# Patient Record
Sex: FEMALE | Race: WHITE | Employment: OTHER | URBAN - METROPOLITAN AREA
[De-identification: names, ages, dates, MRNs, and addresses within clinical notes are randomized per-mention and may not be internally consistent; named-entity substitution may affect disease eponyms.]

---

## 2017-12-29 ENCOUNTER — HOSPITAL ENCOUNTER (OUTPATIENT)
Dept: MAMMOGRAPHY | Age: 72
Discharge: HOME | End: 2017-12-29
Attending: OBSTETRICS & GYNECOLOGY | Admitting: OBSTETRICS & GYNECOLOGY

## 2017-12-29 DIAGNOSIS — Z12.31 VISIT FOR SCREENING MAMMOGRAM: ICD-10-CM

## 2018-01-09 ENCOUNTER — PROCEDURE VISIT (OUTPATIENT)
Age: 73
End: 2018-01-09

## 2018-01-09 ENCOUNTER — OFFICE VISIT (OUTPATIENT)
Age: 73
End: 2018-01-09

## 2018-01-09 ENCOUNTER — HOSPITAL ENCOUNTER (OUTPATIENT)
Dept: GENERAL RADIOLOGY | Age: 73
Discharge: HOME | End: 2018-01-09
Attending: INTERNAL MEDICINE | Admitting: INTERNAL MEDICINE

## 2018-01-09 VITALS
SYSTOLIC BLOOD PRESSURE: 110 MMHG | BODY MASS INDEX: 33.27 KG/M2 | DIASTOLIC BLOOD PRESSURE: 68 MMHG | HEIGHT: 67 IN | WEIGHT: 212 LBS

## 2018-01-09 DIAGNOSIS — M89.9 DISORDER OF BONE AND CARTILAGE: ICD-10-CM

## 2018-01-09 DIAGNOSIS — Z51.81 MEDICATION MONITORING ENCOUNTER: ICD-10-CM

## 2018-01-09 DIAGNOSIS — M94.9 DISORDER OF BONE AND CARTILAGE: Primary | ICD-10-CM

## 2018-01-09 DIAGNOSIS — M89.9 DISORDER OF BONE AND CARTILAGE: Primary | ICD-10-CM

## 2018-01-09 DIAGNOSIS — M94.9 DISORDER OF BONE AND CARTILAGE: ICD-10-CM

## 2018-01-09 DIAGNOSIS — E55.9 VITAMIN D DEFICIENCY: ICD-10-CM

## 2018-01-09 PROCEDURE — G8399 PT W/DXA RESULTS DOCUMENT: HCPCS | Performed by: INTERNAL MEDICINE

## 2018-01-09 PROCEDURE — 99214 OFFICE O/P EST MOD 30 MIN: CPT | Performed by: INTERNAL MEDICINE

## 2018-01-09 PROCEDURE — 77080 DXA BONE DENSITY AXIAL: CPT | Performed by: INTERNAL MEDICINE

## 2018-01-09 PROCEDURE — 3014F SCREEN MAMMO DOC REV: CPT | Performed by: INTERNAL MEDICINE

## 2018-01-09 PROCEDURE — G8417 CALC BMI ABV UP PARAM F/U: HCPCS | Performed by: INTERNAL MEDICINE

## 2018-01-09 PROCEDURE — 1123F ACP DISCUSS/DSCN MKR DOCD: CPT | Performed by: INTERNAL MEDICINE

## 2018-01-09 PROCEDURE — 4040F PNEUMOC VAC/ADMIN/RCVD: CPT | Performed by: INTERNAL MEDICINE

## 2018-01-09 PROCEDURE — G8427 DOCREV CUR MEDS BY ELIG CLIN: HCPCS | Performed by: INTERNAL MEDICINE

## 2018-01-09 PROCEDURE — 1090F PRES/ABSN URINE INCON ASSESS: CPT | Performed by: INTERNAL MEDICINE

## 2018-01-09 PROCEDURE — 1036F TOBACCO NON-USER: CPT | Performed by: INTERNAL MEDICINE

## 2018-01-09 PROCEDURE — G8484 FLU IMMUNIZE NO ADMIN: HCPCS | Performed by: INTERNAL MEDICINE

## 2018-01-09 PROCEDURE — 3017F COLORECTAL CA SCREEN DOC REV: CPT | Performed by: INTERNAL MEDICINE

## 2018-01-09 RX ORDER — ALENDRONATE SODIUM 70 MG/1
TABLET ORAL
Qty: 12 TABLET | Refills: 4 | Status: SHIPPED | OUTPATIENT
Start: 2018-01-09 | End: 2019-01-28 | Stop reason: SDUPTHER

## 2018-01-09 NOTE — PROGRESS NOTES
from chair without using arms. No apparent focal motor or sensory deficit. Reflexes brisk and symmetric. Coordination appears normal.  MUSCULOSKELETAL EXAM:  Gait: Intact without difficulty. Steady without assistance. Spine: Spinal contours are normal.  No spine tenderness to palpation or percussion. Ribs and pelvis: Ribs appear normal. Pelvis appears normal. Two finger spaces between ribs and pelvis. BONE DENSITY:  Most recent done here using Hologic equipment. T-scores  Initial study: 10/16/1996 spine L2+L4 -0.1 Lowest hip (left total hip) +0.3   Current study: 12/27/2016 spine L2+L4 -1.9 Lowest hip (left fem. neck) -0.5     The table below shows bone mineral density (grams/cm2), the appropriate measure for comparing serial scans An increase or decrease is significant based on precision studies done at our center according to the ISCD protocol. .      PA spine Proximal Femur (left)   Date L2+L4 Fem. neck Trochanter Total hip   10/16/1996 1.058 0.915 0.758 0.979   12/08/2003 0.886 0.811 0.718 0.948   07/25/2005 0.964 0.865 0.749 0.986   07/31/2006 0.897 0.897 0.740 0.992   08/24/2007 0.910 0.902 0.744 0.991   10/02/2009 0.940 0.827 0.776 1.004   10/24/2011 0.923 0.782 0.705 0.913   10/29/2013 0.865 0.835 0.674 0.934   11/28/2014 0.869 0.833 0.682 0.952   12/22/2015 0.834 0.792 0.664 0.915   12/27/2016 0.896 0.792 0.683 0.905   01/09/2018 0.863 0.798 0.685 0.901      IMPRESSION:  BONE DENSITY IS LOW. BETWEEN 2016 AND 2018, BMD DID NOT CHANGE SIGNIFICANTLY IN THE LEFT HIP. IT IS LOWER NOW IN THE SPINE BUT SIMILAR TO 2013/2014/2015. LABS. 03/2015, Cr 0.8. 12/2015 . 05/2017 Ca 9.6 Cr 0.8. IMAGING. DXA printouts reviewed. ASSESSMENT: Vitamin D deficiency now corrected. Bone density borderline low and stable or improved with Actonel [id number]. She is doing well with alendronate restarted 01/2016. PLANS: Continue alendronate 70 mg week.  Return appointment in 1 year with DXA      I

## 2019-01-15 ENCOUNTER — OFFICE VISIT (OUTPATIENT)
Dept: ENDOCRINOLOGY | Age: 74
End: 2019-01-15
Payer: MEDICARE

## 2019-01-15 ENCOUNTER — HOSPITAL ENCOUNTER (OUTPATIENT)
Dept: GENERAL RADIOLOGY | Age: 74
Discharge: HOME | End: 2019-01-15
Payer: MEDICARE

## 2019-01-15 ENCOUNTER — PROCEDURE VISIT (OUTPATIENT)
Dept: ENDOCRINOLOGY | Age: 74
End: 2019-01-15
Payer: MEDICARE

## 2019-01-15 VITALS
SYSTOLIC BLOOD PRESSURE: 120 MMHG | DIASTOLIC BLOOD PRESSURE: 79 MMHG | HEIGHT: 67 IN | BODY MASS INDEX: 36.35 KG/M2 | WEIGHT: 231.6 LBS

## 2019-01-15 DIAGNOSIS — M89.9 DISORDER OF BONE AND CARTILAGE: Primary | ICD-10-CM

## 2019-01-15 DIAGNOSIS — M94.9 DISORDER OF BONE AND CARTILAGE: Primary | ICD-10-CM

## 2019-01-15 DIAGNOSIS — Z51.81 MEDICATION MONITORING ENCOUNTER: ICD-10-CM

## 2019-01-15 DIAGNOSIS — E55.9 VITAMIN D DEFICIENCY: ICD-10-CM

## 2019-01-15 DIAGNOSIS — M94.9 DISORDER OF BONE AND CARTILAGE: ICD-10-CM

## 2019-01-15 DIAGNOSIS — M89.9 DISORDER OF BONE AND CARTILAGE: ICD-10-CM

## 2019-01-15 PROCEDURE — 1090F PRES/ABSN URINE INCON ASSESS: CPT | Performed by: INTERNAL MEDICINE

## 2019-01-15 PROCEDURE — 77080 DXA BONE DENSITY AXIAL: CPT

## 2019-01-15 PROCEDURE — 77080 DXA BONE DENSITY AXIAL: CPT | Performed by: INTERNAL MEDICINE

## 2019-01-15 PROCEDURE — 1123F ACP DISCUSS/DSCN MKR DOCD: CPT | Performed by: INTERNAL MEDICINE

## 2019-01-15 PROCEDURE — G8399 PT W/DXA RESULTS DOCUMENT: HCPCS | Performed by: INTERNAL MEDICINE

## 2019-01-15 PROCEDURE — 99214 OFFICE O/P EST MOD 30 MIN: CPT | Performed by: INTERNAL MEDICINE

## 2019-01-15 PROCEDURE — 1036F TOBACCO NON-USER: CPT | Performed by: INTERNAL MEDICINE

## 2019-01-15 PROCEDURE — 3017F COLORECTAL CA SCREEN DOC REV: CPT | Performed by: INTERNAL MEDICINE

## 2019-01-15 PROCEDURE — G8484 FLU IMMUNIZE NO ADMIN: HCPCS | Performed by: INTERNAL MEDICINE

## 2019-01-15 PROCEDURE — G8417 CALC BMI ABV UP PARAM F/U: HCPCS | Performed by: INTERNAL MEDICINE

## 2019-01-15 PROCEDURE — 1101F PT FALLS ASSESS-DOCD LE1/YR: CPT | Performed by: INTERNAL MEDICINE

## 2019-01-15 PROCEDURE — G8427 DOCREV CUR MEDS BY ELIG CLIN: HCPCS | Performed by: INTERNAL MEDICINE

## 2019-01-15 PROCEDURE — 4040F PNEUMOC VAC/ADMIN/RCVD: CPT | Performed by: INTERNAL MEDICINE

## 2019-01-15 RX ORDER — M-VIT,TX,IRON,MINS/CALC/FOLIC 27MG-0.4MG
1 TABLET ORAL DAILY
Qty: 12 TABLET | Refills: 4 | Status: SHIPPED | OUTPATIENT
Start: 2019-01-15

## 2019-01-28 ENCOUNTER — TELEPHONE (OUTPATIENT)
Dept: ENDOCRINOLOGY | Age: 74
End: 2019-01-28

## 2019-01-28 RX ORDER — ALENDRONATE SODIUM 70 MG/1
TABLET ORAL
Qty: 12 TABLET | Refills: 4 | Status: SHIPPED | OUTPATIENT
Start: 2019-01-28 | End: 2019-11-05 | Stop reason: SDUPTHER

## 2019-11-15 ENCOUNTER — HOSPITAL ENCOUNTER (OUTPATIENT)
Dept: MAMMOGRAPHY | Age: 74
Discharge: HOME | End: 2019-11-15
Payer: MEDICARE

## 2019-11-15 DIAGNOSIS — Z12.31 VISIT FOR SCREENING MAMMOGRAM: ICD-10-CM

## 2019-11-15 PROCEDURE — 77063 BREAST TOMOSYNTHESIS BI: CPT

## 2020-01-21 ENCOUNTER — HOSPITAL ENCOUNTER (OUTPATIENT)
Dept: GENERAL RADIOLOGY | Age: 75
Discharge: HOME | End: 2020-01-21
Payer: MEDICARE

## 2020-01-21 ENCOUNTER — PROCEDURE VISIT (OUTPATIENT)
Dept: ENDOCRINOLOGY | Age: 75
End: 2020-01-21
Payer: MEDICARE

## 2020-01-21 ENCOUNTER — OFFICE VISIT (OUTPATIENT)
Dept: ENDOCRINOLOGY | Age: 75
End: 2020-01-21
Payer: MEDICARE

## 2020-01-21 VITALS
SYSTOLIC BLOOD PRESSURE: 113 MMHG | BODY MASS INDEX: 33.62 KG/M2 | DIASTOLIC BLOOD PRESSURE: 69 MMHG | WEIGHT: 214.2 LBS | HEIGHT: 67 IN

## 2020-01-21 PROCEDURE — G8417 CALC BMI ABV UP PARAM F/U: HCPCS | Performed by: INTERNAL MEDICINE

## 2020-01-21 PROCEDURE — G8427 DOCREV CUR MEDS BY ELIG CLIN: HCPCS | Performed by: INTERNAL MEDICINE

## 2020-01-21 PROCEDURE — 77080 DXA BONE DENSITY AXIAL: CPT | Performed by: INTERNAL MEDICINE

## 2020-01-21 PROCEDURE — 1036F TOBACCO NON-USER: CPT | Performed by: INTERNAL MEDICINE

## 2020-01-21 PROCEDURE — 77080 DXA BONE DENSITY AXIAL: CPT

## 2020-01-21 PROCEDURE — G8484 FLU IMMUNIZE NO ADMIN: HCPCS | Performed by: INTERNAL MEDICINE

## 2020-01-21 PROCEDURE — G8399 PT W/DXA RESULTS DOCUMENT: HCPCS | Performed by: INTERNAL MEDICINE

## 2020-01-21 PROCEDURE — 4040F PNEUMOC VAC/ADMIN/RCVD: CPT | Performed by: INTERNAL MEDICINE

## 2020-01-21 PROCEDURE — 1123F ACP DISCUSS/DSCN MKR DOCD: CPT | Performed by: INTERNAL MEDICINE

## 2020-01-21 PROCEDURE — 3017F COLORECTAL CA SCREEN DOC REV: CPT | Performed by: INTERNAL MEDICINE

## 2020-01-21 PROCEDURE — 99214 OFFICE O/P EST MOD 30 MIN: CPT | Performed by: INTERNAL MEDICINE

## 2020-01-21 PROCEDURE — 1090F PRES/ABSN URINE INCON ASSESS: CPT | Performed by: INTERNAL MEDICINE

## 2020-01-21 RX ORDER — EPINEPHRINE 0.3 MG/.3ML
INJECTION SUBCUTANEOUS
COMMUNITY

## 2020-01-21 RX ORDER — ALENDRONATE SODIUM 70 MG/1
TABLET ORAL
Qty: 12 TABLET | Refills: 4 | Status: SHIPPED | OUTPATIENT
Start: 2020-01-21 | End: 2022-02-15 | Stop reason: SDUPTHER

## 2020-01-21 RX ORDER — TRIAMCINOLONE ACETONIDE 0.1 %
PASTE (GRAM) DENTAL
COMMUNITY

## 2020-01-21 NOTE — PROGRESS NOTES
sensory deficit. Reflexes brisk and symmetric. Coordination appears normal.  MUSCULOSKELETAL EXAM:  Gait: Intact without difficulty. Steady without assistance. Spine: Spinal contours are normal.  No spine tenderness to palpation or percussion. Ribs and pelvis: Ribs appear normal. Pelvis appears normal. Two finger spaces between ribs and pelvis. BONE DENSITY:  Most recent done here using Hologic equipment. T-scores  Initial study: 10/16/1996 spine L2+L4 -0.1 Lowest hip (left total hip) +0.3   Current study: 01/21/2020 spine L2+L4 -1.7 Lowest hip (left fem. neck) -0.5     The table below shows bone mineral density (grams/cm2), the appropriate measure for comparing serial scans. An increase or decrease is significant based on precision studies done at our center according to the ISCD protocol. PA spine Proximal Femur (left)   Date L2+L4 Fem. neck Trochanter Total hip   10/16/1996 1.058 0.915 0.758 0.979   12/08/2003 0.886 0.811 0.718 0.948   07/25/2005 0.964 0.865 0.749 0.986   07/31/2006 0.897 0.897 0.740 0.992   08/24/2007 0.910 0.902 0.744 0.991   10/02/2009 0.940 0.827 0.776 1.004   10/24/2011 0.923 0.782 0.705 0.913   10/29/2013 0.865 0.835 0.674 0.934   11/28/2014 0.869 0.833 0.682 0.952   12/22/2015 0.834 0.792 0.664 0.915   12/27/2016 0.896 0.792 0.683 0.905   01/09/2018 0.863 0.798 0.685 0.901   01/15/2019 0.891 0.791 0.676 0.905   01/21/2020 0.935 0.786 0.685 0.913      IMPRESSION:  BONE DENSITY IS LOW. SINCE THE PREVIOUS DXA, BMD INCREASED IN THE SPINE AND DID NOT CHANGE SIGNIFICANTLY IN THE LEFT HIP. COMPARED WITH 2015, BMD IS HIGHER NOW IN THE SPINE.     LABS. 03/2015, Cr 0.8. 12/2015 . 05/2017 Ca 9.6 Cr 0.8. 01/2019 Ca 8.8 Cr 0.6. IMAGING. DXA printouts reviewed. ASSESSMENT: Vitamin D deficiency now corrected. Bone density borderline low and stable or improved with Actonel [id number]. She is doing well with alendronate restarted 01/2016.     PLANS: Continue alendronate 70 mg weekly. Likely time for a [place] next year. . Return appointment in 1 year with DXA      I spent 25 minutes face to face with this patient. Over 50% of that time was spent on counseling and care coordination. See assessment and plan for counseling and care coordination details. Talia Queen MD, Director, [facility]) Osteoporosis and Bone Health Services    CC: Jer Polanco MD

## 2020-11-16 ENCOUNTER — HOSPITAL ENCOUNTER (OUTPATIENT)
Dept: MAMMOGRAPHY | Age: 75
Discharge: HOME | End: 2020-11-16
Payer: MEDICARE

## 2020-11-16 PROCEDURE — 77063 BREAST TOMOSYNTHESIS BI: CPT

## 2021-01-26 ENCOUNTER — PROCEDURE VISIT (OUTPATIENT)
Dept: ENDOCRINOLOGY | Age: 76
End: 2021-01-26

## 2021-01-26 ENCOUNTER — OFFICE VISIT (OUTPATIENT)
Dept: ENDOCRINOLOGY | Age: 76
End: 2021-01-26
Payer: MEDICARE

## 2021-01-26 ENCOUNTER — HOSPITAL ENCOUNTER (OUTPATIENT)
Dept: GENERAL RADIOLOGY | Age: 76
Discharge: HOME | End: 2021-01-26
Payer: MEDICARE

## 2021-01-26 VITALS
BODY MASS INDEX: 36.35 KG/M2 | WEIGHT: 231.6 LBS | SYSTOLIC BLOOD PRESSURE: 132 MMHG | HEIGHT: 67 IN | DIASTOLIC BLOOD PRESSURE: 78 MMHG

## 2021-01-26 DIAGNOSIS — M94.9 DISORDER OF BONE AND CARTILAGE: ICD-10-CM

## 2021-01-26 DIAGNOSIS — M89.9 DISORDER OF BONE AND CARTILAGE: ICD-10-CM

## 2021-01-26 DIAGNOSIS — M89.9 DISORDER OF BONE AND CARTILAGE: Primary | ICD-10-CM

## 2021-01-26 DIAGNOSIS — E55.9 VITAMIN D DEFICIENCY: ICD-10-CM

## 2021-01-26 DIAGNOSIS — Z51.81 MEDICATION MONITORING ENCOUNTER: ICD-10-CM

## 2021-01-26 DIAGNOSIS — M94.9 DISORDER OF BONE AND CARTILAGE: Primary | ICD-10-CM

## 2021-01-26 PROCEDURE — G8427 DOCREV CUR MEDS BY ELIG CLIN: HCPCS | Performed by: INTERNAL MEDICINE

## 2021-01-26 PROCEDURE — 1090F PRES/ABSN URINE INCON ASSESS: CPT | Performed by: INTERNAL MEDICINE

## 2021-01-26 PROCEDURE — 1123F ACP DISCUSS/DSCN MKR DOCD: CPT | Performed by: INTERNAL MEDICINE

## 2021-01-26 PROCEDURE — 77080 DXA BONE DENSITY AXIAL: CPT | Performed by: INTERNAL MEDICINE

## 2021-01-26 PROCEDURE — G8484 FLU IMMUNIZE NO ADMIN: HCPCS | Performed by: INTERNAL MEDICINE

## 2021-01-26 PROCEDURE — 4040F PNEUMOC VAC/ADMIN/RCVD: CPT | Performed by: INTERNAL MEDICINE

## 2021-01-26 PROCEDURE — 1036F TOBACCO NON-USER: CPT | Performed by: INTERNAL MEDICINE

## 2021-01-26 PROCEDURE — G8417 CALC BMI ABV UP PARAM F/U: HCPCS | Performed by: INTERNAL MEDICINE

## 2021-01-26 PROCEDURE — 77080 DXA BONE DENSITY AXIAL: CPT

## 2021-01-26 PROCEDURE — G8399 PT W/DXA RESULTS DOCUMENT: HCPCS | Performed by: INTERNAL MEDICINE

## 2021-01-26 PROCEDURE — 99215 OFFICE O/P EST HI 40 MIN: CPT | Performed by: INTERNAL MEDICINE

## 2021-01-26 NOTE — RESULT ENCOUNTER NOTE
[facility]) Osteoporosis and [address] [facility] [address] [place] [address]   Phone [phone]  Fax [phone]    NAME: Jimmy Maxwell   :    STUDY DATE: 2021     REFERRING PHYSICIAN: Katlyn Thompson MD    INDICATION(S) FOR PERFORMING THE STUDY:  osteopenia (M89.9)    CLINICAL INFORMATION PROVIDED BY THE PATIENT: 70-year-old woman. She underwent surgical menopause at age 47. She took estrogen from ~ until ~. No history of fragility fractures. No long-term corticosteroid use. She took Actonel 2004-10/2009 (stopped for a drug holiday, restarted 2016). Family history of osteoporosis (mother). EQUIPMENT: [employer]. POSITIONING: Good   REGIONS OF INTEREST: Correct   ARTIFACTS: None   STUDY VALID? Yes; L1 and L3 were deleted. T-scores  Initial study: 10/16/1996 spine L2+L4 -0.1 Lowest hip (left total hip) +0.3   Current study: 2021 spine L2+L4 -1.5 Lowest hip (left fem. neck) -0.6     The table below shows bone mineral density (grams/cm2), the appropriate measure for comparing serial scans. An increase or decrease is significant based on precision studies done at our center according to the ISCD protocol. PA spine Proximal Femur (left)   Date L2+L4 Fem.  neck Trochanter Total hip   10/16/1996 1.058 0.915 0.758 0.979   2003 0.886 0.811 0.718 0.948   2005 0.964 0.865 0.749 0.986   2006 0.897 0.897 0.740 0.992   2007 0.910 0.902 0.744 0.991   10/02/2009 0.940 0.827 0.776 1.004   10/24/2011 0.923 0.782 0.705 0.913   10/29/2013 0.865 0.835 0.674 0.934   2014 0.869 0.833 0.682 0.952   2015 0.834 0.792 0.664 0.915   2016 0.896 0.792 0.683 0.905   2018 0.863 0.798 0.685 0.901   01/15/2019 0.891 0.791 0.676 0.905   2020 0.935 0.786 0.685 0.913   2021 0.907 0.782 0.705 0.917 IMPRESSION:  BONE DENSITY IS LOW. SINCE THE LAST DXA, BMD DID NOT CHANGE SIGNIFICANTLY IN THE SPINE OR LEFT HIP.  COMPARED WITH 2015, BMD IS HIGHER NOW IN THE SPINE AND TROCHANTER. Consider repeating this study in 2-3 years to assess the patient's progress. _________________________________________________   Gearld Sid Queen MD, Director, [facility]) Osteoporosis and [address] [place] [address]

## 2021-01-26 NOTE — PROGRESS NOTES
[facility] Osteoporosis and [address] [facility] [address] [facility]  Phone [phone]  Fax [phone]    NAME: Bernabe Vallejo OF BIRTH: 1945  INITIAL CONSULTATION: 04/26/2004  LAST OFFICE VISIT: 01/21/2020  TODAY'S VISIT: 01/26/2021    Labs @ [facility] 01/2019    PROBLEMS:   \"Osteopenia\" (12/2003 T-scores spine -1.6, left femoral neck -0.3)    Bone density declined significantly between 1996 and 2003    Mother has severe osteoporosis  Surgical menopause age 47 (1997)  Irritable bowel syndrome  Vitamin D deficiency, desirable 25-OH D is 30 to 60 ng/mL    18 ng/mL 04/2004     46 ng/mL 07/2005 with ergocalciferol 50,000 IU weekly    36 ng/mL 07/2012 with vitamin D 2000 IU/d    64 ng/mL 05/2017    CURRENT MANAGEMENT FOR BONE HEALTH:   Calcium, 450 mg/d from diet + 600 mg supplement + 220 in MVI = 1270 mg/d    300 mg other, 150 mg milk (avoiding yogurt and cheese to lose weight)  Vitamin D, 2000 IU/d + 500 in MVI = 2500 IU/d  Exercise, walking, swimming, riding stationary bike  Pharmacologic therapy, alendronate 70 mg weekly resumed 01/2016 (missed 11/2016)    PREVIOUS MEDICATIONS FOR OSTEOPOROSIS: Estrogen [id number]  Actonel 35 mg weekly started 07/2004, stopped 10/2009 for a drug holiday    OTHER CURRENT MEDICATIONS (SELECTED): None  OTC MEDICATIONS: glucosamine/chondroitin, vitamin C, Metamucil    CHIEF COMPLAINT: Here for f/u visit of osteopenia and vitamin D deficiency, monitoring treatment. No new related signs or symptoms. PAST MEDICAL HISTORY, FAMILY HISTORY, SOCIAL HISTORY AND REVIEW OF SYSTEMS:  Relevant changes since last visit (see patient questionnaire of todays date). INTERVAL HISTORY. See problem list for active/ongoing issues. She resumed taking alendronate 12/2016, correctly and without side effects. No falls, near-falls or fractures. NEUROLOGIC EXAM: Able to rise from chair without using arms. No apparent focal motor or sensory deficit. Reflexes brisk and symmetric. Coordination appears normal.  MUSCULOSKELETAL EXAM:  Gait: Intact without difficulty. Steady without assistance. Spine: Spinal contours are normal.  No spine tenderness to palpation or percussion. Ribs and pelvis: Ribs appear normal. Pelvis appears normal. Two finger spaces between ribs and pelvis. BONE DENSITY:  Most recent done here using Hologic equipment. T-scores  Initial study: 10/16/1996 spine L2+L4 -0.1 Lowest hip (left total hip) +0.3   Current study: 01/26/2021 spine L2+L4 -1.5 Lowest hip (left fem. neck) -0.6     The table below shows bone mineral density (grams/cm2), the appropriate measure for comparing serial scans. An increase or decrease is significant based on precision studies done at our center according to the ISCD protocol. PA spine Proximal Femur (left)   Date L2+L4 Fem. neck Trochanter Total hip   10/16/1996 1.058 0.915 0.758 0.979   12/08/2003 0.886 0.811 0.718 0.948   07/25/2005 0.964 0.865 0.749 0.986   07/31/2006 0.897 0.897 0.740 0.992   08/24/2007 0.910 0.902 0.744 0.991   10/02/2009 0.940 0.827 0.776 1.004   10/24/2011 0.923 0.782 0.705 0.913   10/29/2013 0.865 0.835 0.674 0.934   11/28/2014 0.869 0.833 0.682 0.952   12/22/2015 0.834 0.792 0.664 0.915   12/27/2016 0.896 0.792 0.683 0.905   01/09/2018 0.863 0.798 0.685 0.901   01/15/2019 0.891 0.791 0.676 0.905   01/21/2020 0.935 0.786 0.685 0.913   01/26/2021 0.907 0.782 0.705 0.917      IMPRESSION:  BONE DENSITY IS LOW. SINCE THE LAST DXA, BMD DID NOT CHANGE SIGNIFICANTLY IN THE SPINE OR LEFT HIP.  COMPARED WITH 2015, BMD IS HIGHER NOW IN THE SPINE AND TROCHANTER.     LABS. 03/2015, Cr 0.8. 12/2015 . 05/2017 Ca 9.6 Cr 0.8. 01/2019 Ca 8.8 Cr 0.6. IMAGING. DXA printouts reviewed. ASSESSMENT: Vitamin D deficiency now corrected. Bone density borderline low and stable or improved with Actonel [id number]. She is doing well with alendronate restarted 01/2016. PLANS:  Stop alendronate for a holiday of 1-2 years. Lab: RFP. We discussed testing for her , age 79, and her daughter, age 52. Time spent today: at least 40 minutes. Lit Queen MD, Director, [facility]) Osteoporosis and Bone Health Services    CC: Chi Youngblood MD

## 2021-02-08 LAB
ALBUMIN SERPL-MCNC: 4.4 G/DL (ref 3.5–5.7)
ANION GAP SERPL CALCULATED.3IONS-SCNC: 8 MMOL/L (ref 6–18)
BUN BLDV-MCNC: 14 MG/DL (ref 8–26)
CALCIUM SERPL-MCNC: 9.6 MG/DL (ref 8.5–10.4)
CHLORIDE BLD-SCNC: 105 MEQ/L (ref 98–111)
CO2: 26 MMOL/L (ref 21–31)
CREAT SERPL-MCNC: 0.82 MG/DL (ref 0.6–1.2)
GFR AFRICAN AMERICAN: 78 ML/MIN/1.73 M2
GFR NON-AFRICAN AMERICAN: 68 ML/MIN/1.73 M2
GLUCOSE BLD-MCNC: 92 MG/DL (ref 70–99)
PHOSPHORUS: 3.5 MG/DL (ref 2.5–4.5)
POTASSIUM SERPL-SCNC: 4.1 MEQ/L (ref 3.6–5.1)
SODIUM BLD-SCNC: 139 MEQ/L (ref 135–145)

## 2022-01-14 ENCOUNTER — HOSPITAL ENCOUNTER (OUTPATIENT)
Dept: MAMMOGRAPHY | Age: 77
Discharge: HOME | End: 2022-01-14
Payer: MEDICARE

## 2022-01-14 VITALS — HEIGHT: 68 IN | WEIGHT: 204 LBS | BODY MASS INDEX: 30.92 KG/M2

## 2022-01-14 DIAGNOSIS — Z12.31 VISIT FOR SCREENING MAMMOGRAM: ICD-10-CM

## 2022-01-14 PROCEDURE — 77063 BREAST TOMOSYNTHESIS BI: CPT

## 2022-02-15 ENCOUNTER — HOSPITAL ENCOUNTER (OUTPATIENT)
Dept: GENERAL RADIOLOGY | Age: 77
Discharge: HOME | End: 2022-02-15
Payer: MEDICARE

## 2022-02-15 ENCOUNTER — OFFICE VISIT (OUTPATIENT)
Dept: ENDOCRINOLOGY | Age: 77
End: 2022-02-15
Payer: MEDICARE

## 2022-02-15 ENCOUNTER — PROCEDURE VISIT (OUTPATIENT)
Dept: ENDOCRINOLOGY | Age: 77
End: 2022-02-15

## 2022-02-15 VITALS
HEIGHT: 67 IN | DIASTOLIC BLOOD PRESSURE: 74 MMHG | BODY MASS INDEX: 33.68 KG/M2 | SYSTOLIC BLOOD PRESSURE: 121 MMHG | WEIGHT: 214.6 LBS

## 2022-02-15 DIAGNOSIS — M94.9 DISORDER OF BONE AND CARTILAGE: ICD-10-CM

## 2022-02-15 DIAGNOSIS — M89.9 DISORDER OF BONE AND CARTILAGE: ICD-10-CM

## 2022-02-15 DIAGNOSIS — E55.9 VITAMIN D DEFICIENCY: ICD-10-CM

## 2022-02-15 DIAGNOSIS — M94.9 DISORDER OF BONE AND CARTILAGE: Primary | ICD-10-CM

## 2022-02-15 DIAGNOSIS — M89.9 DISORDER OF BONE AND CARTILAGE: Primary | ICD-10-CM

## 2022-02-15 DIAGNOSIS — Z51.81 MEDICATION MONITORING ENCOUNTER: ICD-10-CM

## 2022-02-15 PROCEDURE — 4040F PNEUMOC VAC/ADMIN/RCVD: CPT | Performed by: INTERNAL MEDICINE

## 2022-02-15 PROCEDURE — 1036F TOBACCO NON-USER: CPT | Performed by: INTERNAL MEDICINE

## 2022-02-15 PROCEDURE — 1123F ACP DISCUSS/DSCN MKR DOCD: CPT | Performed by: INTERNAL MEDICINE

## 2022-02-15 PROCEDURE — G8427 DOCREV CUR MEDS BY ELIG CLIN: HCPCS | Performed by: INTERNAL MEDICINE

## 2022-02-15 PROCEDURE — 77080 DXA BONE DENSITY AXIAL: CPT | Performed by: INTERNAL MEDICINE

## 2022-02-15 PROCEDURE — G8484 FLU IMMUNIZE NO ADMIN: HCPCS | Performed by: INTERNAL MEDICINE

## 2022-02-15 PROCEDURE — G8399 PT W/DXA RESULTS DOCUMENT: HCPCS | Performed by: INTERNAL MEDICINE

## 2022-02-15 PROCEDURE — 1090F PRES/ABSN URINE INCON ASSESS: CPT | Performed by: INTERNAL MEDICINE

## 2022-02-15 PROCEDURE — 77080 DXA BONE DENSITY AXIAL: CPT

## 2022-02-15 PROCEDURE — G8417 CALC BMI ABV UP PARAM F/U: HCPCS | Performed by: INTERNAL MEDICINE

## 2022-02-15 PROCEDURE — 99214 OFFICE O/P EST MOD 30 MIN: CPT | Performed by: INTERNAL MEDICINE

## 2022-02-15 RX ORDER — ALENDRONATE SODIUM 70 MG/1
TABLET ORAL
Qty: 12 TABLET | Refills: 4 | Status: SHIPPED | OUTPATIENT
Start: 2022-02-15 | End: 2022-02-15 | Stop reason: SDUPTHER

## 2022-02-15 RX ORDER — ALENDRONATE SODIUM 70 MG/1
TABLET ORAL
Qty: 12 TABLET | Refills: 4 | Status: SHIPPED | OUTPATIENT
Start: 2022-02-15

## 2022-02-15 NOTE — PROGRESS NOTES
symmetric. Coordination appears normal.  MUSCULOSKELETAL EXAM:  Gait: Intact without difficulty. Steady without assistance. Spine: Spinal contours are normal.  No spine tenderness to palpation or percussion. Ribs and pelvis: Ribs appear normal. Pelvis appears normal. Two finger spaces between ribs and pelvis. BONE DENSITY:  Most recent done here using Hologic equipment. T-scores  Initial study: 10/16/1996 spine L2+L4 -0.1 Lowest hip (left total hip) +0.3   Current study: 02/15/2022 spine L2+L4 -2.0 Lowest hip (left fem. neck) -0.7     The table below shows bone mineral density (grams/cm2), the appropriate measure for comparing serial scans. An increase or decrease is significant based on precision studies done at our center according to the ISCD protocol. PA spine Proximal Femur (left)   Date L2+L4 Fem. neck Trochanter Total hip   10/16/1996 1.058 0.915 0.758 0.979   12/08/2003 0.886 0.811 0.718 0.948   10/02/2009 0.940 0.827 0.776 1.004   10/24/2011 0.923 0.782 0.705 0.913   11/28/2014 0.869 0.833 0.682 0.952   12/22/2015 0.834 0.792 0.664 0.915   12/27/2016 0.896 0.792 0.683 0.905   01/21/2020 0.935 0.786 0.685 0.913   01/26/2021 0.907 0.782 0.705 0.917   02/15/2022 0.852 0.767 0.667 0.883      IMPRESSION:  BONE DENSITY IS LOW. SINCE THE LAST DXA, BMD DECREASED IN THE SPINE, TROCHANTER AND TOTAL HIP AND IS TRENDING DOWN IN THE FEMORAL NECK. LABS. 03/2015 Cr 0.8. 12/2015 . 05/2017 Ca 9.6 Cr 0.8. 01/2019 Ca 8.8 Cr 0.6.   02/2021 Ca 9.6 Cr 0.8. IMAGING. DXA printouts reviewed. ASSESSMENT: Vitamin D deficiency now corrected. Bone density borderline low and stable or improved with Actonel [id number]. She did well with alendronate 01/2016-01/2021 but BMD decreased 0933-1324 on holiday.      PLANS:  Start back on alendronate 70 mg weekly. Rx sent, dosing instructions reviewed. Return appointment in 1 year. Time spent today: 30-39 minutes. Omar Queen MD, Director, [facility] Osteoporosis and Bone Health Services    CC: Marla Yap MD

## 2022-02-15 NOTE — RESULT ENCOUNTER NOTE
[facility]) Osteoporosis and [address] [facility] [address] [facility], [address] [place] [address],[place] [address]  Phone [phone]  Fax [phone]    NAME: Angeles Powell   :    STUDY DATE: 02/15/2022     REFERRING PHYSICIAN: Jacob Cagle MD    INDICATION(S) FOR PERFORMING THE STUDY:  osteopenia (M89.9)    CLINICAL INFORMATION PROVIDED BY THE PATIENT: 79-year-old woman. She underwent surgical menopause at age 47. She took estrogen from ~ until ~. No history of fragility fractures. No long-term corticosteroid use. She took Actonel 2004-10/2009 (stopped for a holiday, restarted 2016). Family history of osteoporosis (mother). EQUIPMENT: Hologic Discovery. POSITIONING: Good   REGIONS OF INTEREST: Correct   ARTIFACTS: None   STUDY VALID? Yes; L1 and L3 were deleted. T-scores  Initial study: 10/16/1996 spine L2+L4 -0.1 Lowest hip (left total hip) +0.3   Current study: 02/15/2022 spine L2+L4 -2.0 Lowest hip (left fem. neck) -0.7     The table below shows bone mineral density (grams/cm2), the appropriate measure for comparing serial scans. An increase or decrease is significant based on precision studies done at our center according to the ISCD protocol. PA spine Proximal Femur (left)   Date L2+L4 Fem. neck Trochanter Total hip   10/16/1996 1.058 0.915 0.758 0.979   2003 0.886 0.811 0.718 0.948   10/02/2009 0.940 0.827 0.776 1.004   10/24/2011 0.923 0.782 0.705 0.913   2014 0.869 0.833 0.682 0.952   2015 0.834 0.792 0.664 0.915   2016 0.896 0.792 0.683 0.905   2020 0.935 0.786 0.685 0.913   2021 0.907 0.782 0.705 0.917   02/15/2022 0.852 0.767 0.667 0.883      IMPRESSION:  BONE DENSITY IS LOW. SINCE THE LAST DXA, BMD DECREASED IN THE SPINE, TROCHANTER AND TOTAL HIP AND IS TRENDING DOWN IN THE FEMORAL NECK. Consider repeating this study in 1-2 years to assess the patient's progress.   _________________________________________________ Edilson Queen MD, Director, [facility]) Osteoporosis and [address]

## 2023-01-28 PROBLEM — M85.89 OSTEOPENIA OF MULTIPLE SITES: Status: ACTIVE | Noted: 2023-01-28

## 2023-02-27 ENCOUNTER — OFFICE VISIT (OUTPATIENT)
Dept: ENDOCRINOLOGY | Age: 78
End: 2023-02-27
Payer: MEDICARE

## 2023-02-27 ENCOUNTER — HOSPITAL ENCOUNTER (OUTPATIENT)
Dept: GENERAL RADIOLOGY | Age: 78
Discharge: HOME | End: 2023-02-27
Payer: MEDICARE

## 2023-02-27 ENCOUNTER — PROCEDURE VISIT (OUTPATIENT)
Dept: ENDOCRINOLOGY | Age: 78
End: 2023-02-27

## 2023-02-27 VITALS
HEIGHT: 67 IN | SYSTOLIC BLOOD PRESSURE: 151 MMHG | DIASTOLIC BLOOD PRESSURE: 80 MMHG | BODY MASS INDEX: 34.09 KG/M2 | WEIGHT: 217.2 LBS

## 2023-02-27 DIAGNOSIS — M89.9 DISORDER OF BONE AND CARTILAGE: ICD-10-CM

## 2023-02-27 DIAGNOSIS — Z51.81 MEDICATION MONITORING ENCOUNTER: ICD-10-CM

## 2023-02-27 DIAGNOSIS — M94.9 DISORDER OF BONE AND CARTILAGE: Primary | ICD-10-CM

## 2023-02-27 DIAGNOSIS — M94.9 DISORDER OF BONE AND CARTILAGE: ICD-10-CM

## 2023-02-27 DIAGNOSIS — M89.9 DISORDER OF BONE AND CARTILAGE: Primary | ICD-10-CM

## 2023-02-27 DIAGNOSIS — M85.89 OSTEOPENIA OF MULTIPLE SITES: ICD-10-CM

## 2023-02-27 DIAGNOSIS — E55.9 VITAMIN D DEFICIENCY: ICD-10-CM

## 2023-02-27 PROCEDURE — 1090F PRES/ABSN URINE INCON ASSESS: CPT | Performed by: INTERNAL MEDICINE

## 2023-02-27 PROCEDURE — G8484 FLU IMMUNIZE NO ADMIN: HCPCS | Performed by: INTERNAL MEDICINE

## 2023-02-27 PROCEDURE — G8399 PT W/DXA RESULTS DOCUMENT: HCPCS | Performed by: INTERNAL MEDICINE

## 2023-02-27 PROCEDURE — G8427 DOCREV CUR MEDS BY ELIG CLIN: HCPCS | Performed by: INTERNAL MEDICINE

## 2023-02-27 PROCEDURE — 1036F TOBACCO NON-USER: CPT | Performed by: INTERNAL MEDICINE

## 2023-02-27 PROCEDURE — 1123F ACP DISCUSS/DSCN MKR DOCD: CPT | Performed by: INTERNAL MEDICINE

## 2023-02-27 PROCEDURE — 77080 DXA BONE DENSITY AXIAL: CPT

## 2023-02-27 PROCEDURE — 77080 DXA BONE DENSITY AXIAL: CPT | Performed by: INTERNAL MEDICINE

## 2023-02-27 PROCEDURE — G8417 CALC BMI ABV UP PARAM F/U: HCPCS | Performed by: INTERNAL MEDICINE

## 2023-02-27 PROCEDURE — 99214 OFFICE O/P EST MOD 30 MIN: CPT | Performed by: INTERNAL MEDICINE

## 2023-02-27 RX ORDER — LIDOCAINE 50 MG/G
1 PATCH TOPICAL EVERY 24 HOURS
COMMUNITY
Start: 2022-11-16

## 2023-02-27 RX ORDER — GABAPENTIN 300 MG/1
300 CAPSULE ORAL 3 TIMES DAILY
COMMUNITY

## 2023-02-27 RX ORDER — ALENDRONATE SODIUM 70 MG/1
TABLET ORAL
Qty: 12 TABLET | Refills: 4 | Status: SHIPPED | OUTPATIENT
Start: 2023-02-27

## 2023-02-27 NOTE — PROGRESS NOTES
[facility]) Osteoporosis and [address] [facility]  Phone [phone]  Fax [phone]    NAME: Kellie Escamilla OF BIRTH: 1945  INITIAL CONSULTATION: 04/26/2004  LAST OFFICE VISIT: 02/15/2022  TODAY'S VISIT: 02/27/2023    Labs @  12/2022    PROBLEMS:   \"Osteopenia\" (12/2003 T-scores spine -1.6, left femoral neck -0.3)    Bone density declined significantly between 1996 and 2003    Mother has severe osteoporosis  Surgical menopause age 47 (1997)  Irritable bowel syndrome  Vitamin D deficiency, desirable 25-OH D is 30 to 60 ng/mL    18 ng/mL 04/2004     46 ng/mL 07/2005 with ergocalciferol 50,000 IU weekly    36 ng/mL 07/2012 with vitamin D 2000 IU/d    64 ng/mL 05/2017    CURRENT MANAGEMENT FOR BONE HEALTH:   Calcium, 450 mg/d from diet + 600 mg supplement + 220 in MVI = 1270 mg/d    300 mg other, 150 mg milk (avoiding yogurt and cheese to lose weight)  Vitamin D, 2000 IU/d + 500 in MVI = 2500 IU/d  Exercise, walking, swimming, riding stationary bike  Pharmacologic therapy, alendronate 70 mg weekly again 01/2016-01/2021, resumed 02/2022    PREVIOUS MEDICATIONS FOR OSTEOPOROSIS: Estrogen [id number]  Actonel 35 mg weekly 07/2004-10/2009, stopped for a holiday    OTHER CURRENT MEDICATIONS (SELECTED): None  OTC MEDICATIONS: glucosamine/chondroitin, vitamin C, Metamucil    CHIEF COMPLAINT: Here for f/u visit of osteopenia and vitamin D deficiency, monitoring treatment. No new related signs or symptoms. PAST MEDICAL HISTORY, FAMILY HISTORY, SOCIAL HISTORY AND REVIEW OF SYSTEMS:  Relevant changes since last visit (see patient questionnaire of today's date). INTERVAL HISTORY. See problem list for active/ongoing issues. She has been taking alendronate correctly and without side effects. No falls, near-falls or fractures.    08/2022 abdominal surgery for a softball-size benign lesion between the stomach and spleen; spleen and 20% of stomach removed. 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 Lost a front tooth last week from my 's pizza!   Has a temporary replacement and likely will have a bridge. NEUROLOGIC EXAM: Able to rise from chair without using arms. No apparent focal motor or sensory deficit. Reflexes brisk and symmetric. Coordination appears normal.  MUSCULOSKELETAL EXAM:  Gait: Intact without difficulty. Steady without assistance. Spine: Spinal contours are normal.  No spine tenderness to palpation or percussion. Ribs and pelvis: Ribs appear normal. Pelvis appears normal. Two finger spaces between ribs and pelvis. BONE DENSITY:  Most recent done here using [employer] equipment. T-scores  Initial study: 10/16/1996 spine L2+L4 -0.1 Lowest hip (left total hip) +0.3   Current study: 02/27/2023 02/27/2023 -0.7 Lowest hip (left fem. neck) -0.7     The table below shows bone mineral density (grams/cm2), the appropriate measure for comparing serial scans. An increase or decrease is significant based on precision studies done at our center according to the ISCD protocol. PA spine Proximal Femur (left)   Date L2+L4 Fem. neck Trochanter Total hip   10/16/1996 1.058 0.915 0.758 0.979   12/08/2003 0.886 0.811 0.718 0.948   10/02/2009 0.940 0.827 0.776 1.004   10/24/2011 0.923 0.782 0.705 0.913   11/28/2014 0.869 0.833 0.682 0.952   12/22/2015 0.834 0.792 0.664 0.915   01/21/2020 0.935 0.786 0.685 0.913   01/26/2021 0.907 0.782 0.705 0.917   02/15/2022 0.852 0.767 0.667 0.883   02/27/2023 0.995 0.769 0.609 0.846      IMPRESSION:  BONE DENSITY NOW NORMAL. SINCE THE LAST DXA, BMD INCREASED IN THE SPINE BUT IS LOWER IN THE TROCHANTER AND TOTAL HIP. LABS. 03/2015 Cr 0.8. 12/2015 . 05/2017 Ca 9.6 Cr 0.8. 01/2019 Ca 8.8 Cr 0.6.   02/2021 Ca 9.6 Cr 0.8. 12/2022 Ca 9.2 Cr 0.7. IMAGING. DXA printouts reviewed. ASSESSMENT: Vitamin D deficiency now corrected.   Bone density borderline low and stable or improved with Actonel [id number]. She did well with alendronate 01/2016-01/2021 but BMD decreased 0647-5562 on holiday.  She is doing OK with alendronate re-started 02/2022. PLANS:  Continue alendronate 70 mg weekly. Return appointment in 1 year. Time spent today: 30-39 minutes. Trish Queen MD, Director, [facility]) Osteoporosis and Bone Health Services    CC: Albert Gu MD

## 2023-02-27 NOTE — PROGRESS NOTES
Patient advised by nurse to monitor blood pressure and follow up with PCP  Patient is under stress related to prior surgery

## 2023-04-21 ENCOUNTER — HOSPITAL ENCOUNTER (OUTPATIENT)
Dept: MAMMOGRAPHY | Age: 78
Discharge: HOME | End: 2023-04-21
Payer: MEDICARE

## 2023-04-21 VITALS — WEIGHT: 217 LBS | HEIGHT: 66 IN | BODY MASS INDEX: 34.87 KG/M2

## 2023-04-21 DIAGNOSIS — Z12.31 VISIT FOR SCREENING MAMMOGRAM: ICD-10-CM

## 2023-04-21 PROCEDURE — 77063 BREAST TOMOSYNTHESIS BI: CPT

## 2023-04-28 LAB — VITAMIN D 25-HYDROXY: 56.4 NG/ML (ref 30–150)

## 2024-02-04 PROBLEM — M85.80 OSTEOPENIA WITH HIGH RISK OF FRACTURE: Status: ACTIVE | Noted: 2023-01-28

## 2024-03-18 ENCOUNTER — HOSPITAL ENCOUNTER (OUTPATIENT)
Dept: GENERAL RADIOLOGY | Age: 79
Discharge: HOME | End: 2024-03-18
Payer: MEDICARE

## 2024-03-18 ENCOUNTER — OFFICE VISIT (OUTPATIENT)
Dept: ENDOCRINOLOGY | Age: 79
End: 2024-03-18
Payer: MEDICARE

## 2024-03-18 VITALS
WEIGHT: 220 LBS | HEART RATE: 67 BPM | HEIGHT: 67 IN | DIASTOLIC BLOOD PRESSURE: 88 MMHG | SYSTOLIC BLOOD PRESSURE: 140 MMHG | RESPIRATION RATE: 16 BRPM | BODY MASS INDEX: 34.53 KG/M2

## 2024-03-18 DIAGNOSIS — M85.80 OSTEOPENIA WITH HIGH RISK OF FRACTURE: ICD-10-CM

## 2024-03-18 DIAGNOSIS — Z51.81 MEDICATION MONITORING ENCOUNTER: ICD-10-CM

## 2024-03-18 DIAGNOSIS — M94.9 DISORDER OF BONE AND CARTILAGE: ICD-10-CM

## 2024-03-18 DIAGNOSIS — M94.9 DISORDER OF BONE AND CARTILAGE: Primary | ICD-10-CM

## 2024-03-18 DIAGNOSIS — M89.9 DISORDER OF BONE AND CARTILAGE: ICD-10-CM

## 2024-03-18 DIAGNOSIS — M89.9 DISORDER OF BONE AND CARTILAGE: Primary | ICD-10-CM

## 2024-03-18 DIAGNOSIS — E55.9 VITAMIN D DEFICIENCY: ICD-10-CM

## 2024-03-18 PROCEDURE — 77080 DXA BONE DENSITY AXIAL: CPT | Performed by: INTERNAL MEDICINE

## 2024-03-18 PROCEDURE — 99214 OFFICE O/P EST MOD 30 MIN: CPT | Performed by: INTERNAL MEDICINE

## 2024-03-18 PROCEDURE — G8399 PT W/DXA RESULTS DOCUMENT: HCPCS | Performed by: INTERNAL MEDICINE

## 2024-03-18 PROCEDURE — G8417 CALC BMI ABV UP PARAM F/U: HCPCS | Performed by: INTERNAL MEDICINE

## 2024-03-18 PROCEDURE — 1036F TOBACCO NON-USER: CPT | Performed by: INTERNAL MEDICINE

## 2024-03-18 PROCEDURE — G2211 COMPLEX E/M VISIT ADD ON: HCPCS | Performed by: INTERNAL MEDICINE

## 2024-03-18 PROCEDURE — G8427 DOCREV CUR MEDS BY ELIG CLIN: HCPCS | Performed by: INTERNAL MEDICINE

## 2024-03-18 PROCEDURE — 1123F ACP DISCUSS/DSCN MKR DOCD: CPT | Performed by: INTERNAL MEDICINE

## 2024-03-18 PROCEDURE — G8484 FLU IMMUNIZE NO ADMIN: HCPCS | Performed by: INTERNAL MEDICINE

## 2024-03-18 PROCEDURE — 77080 DXA BONE DENSITY AXIAL: CPT

## 2024-03-18 PROCEDURE — 1090F PRES/ABSN URINE INCON ASSESS: CPT | Performed by: INTERNAL MEDICINE

## 2024-03-18 RX ORDER — HYDROCORTISONE ACETATE 0.5 %
CREAM (GRAM) TOPICAL
COMMUNITY
Start: 2024-03-11

## 2024-03-18 RX ORDER — ALENDRONATE SODIUM 70 MG/1
TABLET ORAL
Qty: 12 TABLET | Refills: 4 | Status: SHIPPED | OUTPATIENT
Start: 2024-03-18

## 2024-03-29 LAB
ALBUMIN SERPL-MCNC: 4 G/DL (ref 3.5–5.7)
ANION GAP SERPL CALCULATED.3IONS-SCNC: 7 MMOL/L (ref 4–16)
BUN BLDV-MCNC: 13 MG/DL (ref 8–26)
CALCIUM SERPL-MCNC: 10 MG/DL (ref 8.5–10.4)
CHLORIDE BLD-SCNC: 105 MEQ/L (ref 98–111)
CO2: 28 MMOL/L (ref 21–31)
CREAT SERPL-MCNC: 0.81 MG/DL (ref 0.6–1.2)
EGFR (CKD-EPI): 73 ML/MIN/1.73 M2
GLUCOSE BLD-MCNC: 79 MG/DL (ref 70–99)
PHOSPHORUS: 3.3 MG/DL (ref 2.5–4.5)
POTASSIUM SERPL-SCNC: 4.2 MEQ/L (ref 3.6–5.1)
SODIUM BLD-SCNC: 140 MEQ/L (ref 135–145)

## 2024-05-21 ENCOUNTER — HOSPITAL ENCOUNTER (OUTPATIENT)
Dept: MAMMOGRAPHY | Age: 79
Discharge: HOME | End: 2024-05-21
Payer: MEDICARE

## 2024-05-21 VITALS — BODY MASS INDEX: 35.36 KG/M2 | HEIGHT: 66 IN | WEIGHT: 220 LBS

## 2024-05-21 DIAGNOSIS — Z12.31 VISIT FOR SCREENING MAMMOGRAM: ICD-10-CM

## 2024-05-21 PROCEDURE — 77063 BREAST TOMOSYNTHESIS BI: CPT

## 2024-08-07 NOTE — RESULT ENCOUNTER NOTE
[facility]) Osteoporosis and [address] [facility] [address] [facility], [address] [place] [address],[place] [address]  Phone [phone]  Fax [phone]    NAME: Vasiliy Baugh   :    STUDY DATE: 2023     REFERRING PHYSICIAN: Jazmine Gunn MD    INDICATION(S) FOR PERFORMING THE STUDY:  osteopenia (M89.9)    CLINICAL INFORMATION PROVIDED BY THE PATIENT: 74-year-old woman. She underwent surgical menopause at age 47. She took estrogen from ~ until ~. No history of fragility fractures. No long-term corticosteroid use. She took Actonel 2004-10/2009 (stopped for a [place]), again 2016-2021, restarted 2022. Family history of osteoporosis (mother). EQUIPMENT: [employer]. POSITIONING: Good   REGIONS OF INTEREST: Correct   ARTIFACTS: None   STUDY VALID? Yes; L1 and L3 were deleted. T-scores  Initial study: 10/16/1996 spine L2+L4 -0.1 Lowest hip (left total hip) +0.3   Current study: 2023 -0.7 Lowest hip (left fem. neck) -0.7     The table below shows bone mineral density (grams/cm2), the appropriate measure for comparing serial scans. An increase or decrease is significant based on precision studies done at our center according to the ISCD protocol. PA spine Proximal Femur (left)   Date L2+L4 Fem. neck Trochanter Total hip   10/16/1996 1.058 0.915 0.758 0.979   2003 0.886 0.811 0.718 0.948   10/02/2009 0.940 0.827 0.776 1.004   10/24/2011 0.923 0.782 0.705 0.913   2014 0.869 0.833 0.682 0.952   2015 0.834 0.792 0.664 0.915   2020 0.935 0.786 0.685 0.913   2021 0.907 0.782 0.705 0.917   02/15/2022 0.852 0.767 0.667 0.883   2023 0.995 0.769 0.609 0.846      IMPRESSION:  BONE DENSITY NOW NORMAL. SINCE THE LAST DXA, BMD INCREASED IN THE SPINE BUT IS LOWER IN THE TROCHANTER AND TOTAL HIP. Consider repeating this study in 1-2 years to assess the patient's progress.   _________________________________________________ Detail Level: Generalized Laron Queen MD, Director, [facility]oporosis and [address] [place] [address] Detail Level: Simple Detail Level: Detailed

## 2025-02-14 DIAGNOSIS — M85.80 OSTEOPENIA WITH HIGH RISK OF FRACTURE: ICD-10-CM

## 2025-02-14 DIAGNOSIS — M89.9 DISORDER OF BONE AND CARTILAGE: Primary | ICD-10-CM

## 2025-02-14 DIAGNOSIS — M94.9 DISORDER OF BONE AND CARTILAGE: Primary | ICD-10-CM

## 2025-03-25 ENCOUNTER — TELEPHONE (OUTPATIENT)
Dept: ENDOCRINOLOGY | Age: 80
End: 2025-03-25

## 2025-03-25 ENCOUNTER — HOSPITAL ENCOUNTER (OUTPATIENT)
Dept: GENERAL RADIOLOGY | Age: 80
Discharge: HOME | End: 2025-03-25
Payer: MEDICARE

## 2025-03-25 ENCOUNTER — OFFICE VISIT (OUTPATIENT)
Dept: ENDOCRINOLOGY | Age: 80
End: 2025-03-25
Payer: MEDICARE

## 2025-03-25 VITALS — WEIGHT: 222.4 LBS | BODY MASS INDEX: 34.91 KG/M2 | HEIGHT: 67 IN

## 2025-03-25 DIAGNOSIS — E55.9 VITAMIN D DEFICIENCY: ICD-10-CM

## 2025-03-25 DIAGNOSIS — M94.9 DISORDER OF BONE AND CARTILAGE: Primary | ICD-10-CM

## 2025-03-25 DIAGNOSIS — M85.80 OSTEOPENIA WITH HIGH RISK OF FRACTURE: ICD-10-CM

## 2025-03-25 DIAGNOSIS — M89.9 DISORDER OF BONE AND CARTILAGE: Primary | ICD-10-CM

## 2025-03-25 DIAGNOSIS — M94.9 DISORDER OF BONE AND CARTILAGE: ICD-10-CM

## 2025-03-25 DIAGNOSIS — M89.9 DISORDER OF BONE AND CARTILAGE: ICD-10-CM

## 2025-03-25 DIAGNOSIS — Z51.81 MEDICATION MONITORING ENCOUNTER: ICD-10-CM

## 2025-03-25 PROCEDURE — G2211 COMPLEX E/M VISIT ADD ON: HCPCS | Performed by: INTERNAL MEDICINE

## 2025-03-25 PROCEDURE — 1090F PRES/ABSN URINE INCON ASSESS: CPT | Performed by: INTERNAL MEDICINE

## 2025-03-25 PROCEDURE — G8427 DOCREV CUR MEDS BY ELIG CLIN: HCPCS | Performed by: INTERNAL MEDICINE

## 2025-03-25 PROCEDURE — 1123F ACP DISCUSS/DSCN MKR DOCD: CPT | Performed by: INTERNAL MEDICINE

## 2025-03-25 PROCEDURE — 1036F TOBACCO NON-USER: CPT | Performed by: INTERNAL MEDICINE

## 2025-03-25 PROCEDURE — 1159F MED LIST DOCD IN RCRD: CPT | Performed by: INTERNAL MEDICINE

## 2025-03-25 PROCEDURE — G8399 PT W/DXA RESULTS DOCUMENT: HCPCS | Performed by: INTERNAL MEDICINE

## 2025-03-25 PROCEDURE — G8419 CALC BMI OUT NRM PARAM NOF/U: HCPCS | Performed by: INTERNAL MEDICINE

## 2025-03-25 PROCEDURE — 77080 DXA BONE DENSITY AXIAL: CPT

## 2025-03-25 PROCEDURE — 99214 OFFICE O/P EST MOD 30 MIN: CPT | Performed by: INTERNAL MEDICINE

## 2025-03-25 RX ORDER — GABAPENTIN 300 MG/1
CAPSULE ORAL
COMMUNITY

## 2025-03-25 RX ORDER — ALENDRONATE SODIUM 70 MG/1
TABLET ORAL
Qty: 12 TABLET | Refills: 4 | Status: SHIPPED | OUTPATIENT
Start: 2025-03-25

## 2025-03-25 NOTE — TELEPHONE ENCOUNTER
Patient called stating Dr. Queen wanted her neurosurgeons fax number.    Neurosurgeon: Talat Vogel; fax: [phone]

## 2025-09-04 ENCOUNTER — TELEPHONE (OUTPATIENT)
Dept: ENDOCRINOLOGY | Age: 80
End: 2025-09-04